# Patient Record
Sex: FEMALE | Race: WHITE | NOT HISPANIC OR LATINO | URBAN - METROPOLITAN AREA
[De-identification: names, ages, dates, MRNs, and addresses within clinical notes are randomized per-mention and may not be internally consistent; named-entity substitution may affect disease eponyms.]

---

## 2017-02-17 ENCOUNTER — IMPORTED ENCOUNTER (OUTPATIENT)
Dept: URBAN - METROPOLITAN AREA CLINIC 43 | Facility: CLINIC | Age: 77
End: 2017-02-17

## 2017-02-17 PROBLEM — H26.493: Noted: 2017-02-17

## 2017-03-06 ENCOUNTER — IMPORTED ENCOUNTER (OUTPATIENT)
Dept: URBAN - METROPOLITAN AREA CLINIC 43 | Facility: CLINIC | Age: 77
End: 2017-03-06

## 2017-03-06 PROBLEM — H26.493: Noted: 2017-03-06

## 2019-06-11 NOTE — PATIENT DISCUSSION
06/03/19 : Scraped and cultured :heather, viral, and saboraud.  Cultures performed 06/11/19 REPEAT SAB & [place] slide sent to [facility] [place]. F/U [facility] Thursday @ 330 in SRQ.

## 2019-06-13 NOTE — PATIENT DISCUSSION
6/13/19-[place] results were no parasites seen. Still waiting on SAB results. 06/03/19 : Scraped  viral, and saboraud.  Cultures performed 06/11/19 REPEAT SAB & [place] slide sent to [facility] [place]. F/U [facility] Thursday @ 330 in SRQ.

## 2019-06-13 NOTE — PATIENT DISCUSSION
No Improvement since last visit. Start Vorizonazole 200mg-take 1 tablet by mouth 2x a day. Start Natacyin every hour while awake and every 2 hours at night time. Gave pt Grimm Shield to wear with tape.

## 2019-06-18 NOTE — PATIENT DISCUSSION
6/13/19-[place] results were no parasites seen. Still waiting on SAB results. 06/03/19 : Scraped  viral, and saboraud.  Cultures performed 06/11/19 REPEAT SAB & [place] slide sent to [facility] [place]. aas of 06/18/19 no growth.

## 2019-06-18 NOTE — PATIENT DISCUSSION
pt shows Improvement at today's visit. continue Vorizonazole 200mg-take 1 tablet by mouth 2x a day. continue Natacyin every hour while awake and every 2 hours at night time. USE Grimm Shield to wear with tape @ bedtime.

## 2019-06-20 NOTE — PATIENT DISCUSSION
6/13/19-[place] results were no parasites seen. Still waiting on SAB results. 06/03/19 : Scraped  viral, and saboraud.  Cultures performed 06/11/19 REPEAT SAB & [place] slide sent to [facility] [place]. aas of 06/18/19 no growth.

## 2019-06-20 NOTE — PATIENT DISCUSSION
6.18.198 pt shows Improvement at today's visit. continue Vorizonazole 200mg-take 1 tablet by mouth 2x a day. continue Natacyin every hour while awake and every 2 hours at night time. USE Grimm Shield to wear with tape @ bedtime.

## 2019-06-25 NOTE — PATIENT DISCUSSION
6/13/19-[place] results were no parasites seen. Still waiting on SAB results. 06/03/19 : Scraped  viral, and saboraud.  Cultures performed 06/11/19 REPEAT SAB & [place] slide sent to [facility] [place]. aas of 06/18/19 no growth.

## 2019-07-02 NOTE — PATIENT DISCUSSION
6/13/19-[place] results were no parasites seen. Still waiting on SAB results. 06/03/19 : Scraped  viral, and saboraud.  Cultures performed 06/11/19 REPEAT SAB & [place] slide sent to [address] [place] [address]. aas of 06/18/19 no growth.

## 2019-07-23 NOTE — PATIENT DISCUSSION
6/13/19-[place] results were no parasites seen. Still waiting on SAB results. 06/03/19 : Scraped  viral, and saboraud.  Cultures performed 06/11/19 REPEAT SAB & [place] slide sent to [facility] [place]. aas of 06/18/19 no growth.

## 2019-07-23 NOTE — PATIENT DISCUSSION
6.18.19 pt shows Improvement at today's visit. continue Vorizonazole 200mg-take 1 tablet by mouth 2x a day. continue Natacyin every hour while awake and every 2 hours at night time. USE Grimm Shield to wear with tape @ bedtime.

## 2019-07-30 NOTE — PATIENT DISCUSSION
6/13/19-[place] results were no parasites seen. Still waiting on SAB results. 06/03/19 : Scraped  viral, and saboraud.  Cultures performed 06/11/19 REPEAT SAB & [place] slide sent to [facility] [place]. aas of 06/18/19 no growth.

## 2019-10-29 NOTE — PATIENT DISCUSSION
6/13/19-[place] results were no parasites seen. Still waiting on SAB results. 06/03/19 : Scraped  viral, and saboraud.  Cultures performed 06/11/19 REPEAT SAB & [place] slide sent to [address] [place] [address]. aas of 06/18/19 no growth.

## 2019-10-31 NOTE — PATIENT DISCUSSION
6/13/19-[place] results were no parasites seen. Still waiting on SAB results. 06/03/19 : Scraped  viral, and saboraud.  Cultures performed 06/11/19 REPEAT SAB & [place] slide sent to [facility] [place]. aas of 06/18/19 no growth.

## 2020-04-18 ASSESSMENT — TONOMETRY
OD_IOP_MMHG: 12.0
OS_IOP_MMHG: 12.0
OD_IOP_MMHG: 11.0
OS_IOP_MMHG: 12.0

## 2020-04-18 ASSESSMENT — KERATOMETRY
OS_K1POWER_DIOPTERS: 45
OS_AXISANGLE_DEGREES: 175
OD_AXISANGLE_DEGREES: 75
OD_K1POWER_DIOPTERS: 45.25
OD_K2POWER_DIOPTERS: 44
OD_AXISANGLE2_DEGREES: 165
OS_K2POWER_DIOPTERS: 44.75
OD_AXISANGLE2_DEGREES: 105
OD_K1POWER_DIOPTERS: 45.25
OS_K2POWER_DIOPTERS: 44.25
OD_K2POWER_DIOPTERS: 44
OS_AXISANGLE2_DEGREES: 65
OS_AXISANGLE2_DEGREES: 85
OD_AXISANGLE_DEGREES: 75
OS_AXISANGLE_DEGREES: 155
OS_K1POWER_DIOPTERS: 45.25

## 2020-04-18 ASSESSMENT — VISUAL ACUITY
OS_PH: 20/40 +
OS_CC: J1
OD_SC: 4'/100
OS_OTHER: 20/70.
OD_SC: <20/400
OS_SC: 20/50 +2
OD_OTHER: <20/400.
OS_SC: J16
OD_CC: 20/400
OD_SC: 20/30-
OS_SC: 20/30+
OD_PH: 20/300

## 2020-06-23 NOTE — PATIENT DISCUSSION
monitor for now, disc [place] [facility] [place] w/ Cat SX.

## 2020-10-27 NOTE — PATIENT DISCUSSION
monitor for now, disc [place] [facility] for [facility] [place] w/ Cat SX.

## 2021-10-05 NOTE — PATIENT DISCUSSION
monitor for now, disc [place] [facility] for [facility] [place] w/ Cat SX.

## 2021-11-02 NOTE — PATIENT DISCUSSION
monitor for now, disc [place] [facility] visit for Possible CV [place] w/ Cat SX.

## 2021-11-05 NOTE — PATIENT DISCUSSION
Continue Vigamox 1 gt QID until wednesday, OK to return to CL sunday. Remove CLs if irritation occurs.

## 2021-11-05 NOTE — PATIENT DISCUSSION
monitor for now, disc [place] [facility] for Possible CV [place] w/ Cat SX.

## 2021-11-09 NOTE — PATIENT DISCUSSION
monitor for now, disc [place] [facility] visit for Possible CV [place] w/ Cat SX.

## 2021-11-09 NOTE — PATIENT DISCUSSION
Resolved, no staining. Small stromal scar inferiorly. Instructed to watch out for si/sx of ulcer recurring, OK to resume contact lens wear.

## 2022-06-04 ENCOUNTER — TELEPHONE ENCOUNTER (OUTPATIENT)
Dept: URBAN - METROPOLITAN AREA CLINIC 68 | Facility: CLINIC | Age: 82
End: 2022-06-04

## 2022-06-05 ENCOUNTER — TELEPHONE ENCOUNTER (OUTPATIENT)
Dept: URBAN - METROPOLITAN AREA CLINIC 68 | Facility: CLINIC | Age: 82
End: 2022-06-05

## 2022-06-05 RX ORDER — FLUTICASONE PROPIONATE 100 UG/1
FLOVENT DISKUS( 100MCG/BLIST INHALATION  TWICE DAILY ) ACTIVE -HX ENTRY POWDER, METERED RESPIRATORY (INHALATION) TWICE DAILY
Status: ACTIVE | COMMUNITY
Start: 2015-03-12

## 2022-06-05 RX ORDER — VENLAFAXINE HYDROCHLORIDE 75 MG/1
VENLAFAXINE HCL( 75MG ORAL  DAILY ) ACTIVE -HX ENTRY TABLET ORAL DAILY
Status: ACTIVE | COMMUNITY
Start: 2015-03-12

## 2022-06-05 RX ORDER — ESTRADIOL 0.5 MG/1
ESTRACE( 0.5MG ORAL  AS NEEDED ) ACTIVE -HX ENTRY TABLET ORAL AS NEEDED
Status: ACTIVE | COMMUNITY
Start: 2015-03-12

## 2022-06-05 RX ORDER — LORATADINE 10 MG
TABLET,DISINTEGRATING ORAL DAILY
Qty: 30 | Refills: 0 | Status: ACTIVE | COMMUNITY
Start: 2015-03-12

## 2022-06-05 RX ORDER — NYSTATIN 500K UNIT
NYSTATIN( 500000UNIT ORAL  AS NEEDED ) ACTIVE -HX ENTRY TABLET ORAL AS NEEDED
Status: ACTIVE | COMMUNITY
Start: 2015-03-12

## 2022-06-05 RX ORDER — HYDROXYCHLOROQUINE SULFATE 200 MG/1
HYDROXYCHLOROQUINE SULFATE( 200MG ORAL  TWICE DAILY ) ACTIVE -HX ENTRY TABLET, FILM COATED ORAL TWICE DAILY
Status: ACTIVE | COMMUNITY
Start: 2015-03-12

## 2022-06-25 ENCOUNTER — TELEPHONE ENCOUNTER (OUTPATIENT)
Age: 82
End: 2022-06-25

## 2022-06-26 ENCOUNTER — TELEPHONE ENCOUNTER (OUTPATIENT)
Age: 82
End: 2022-06-26

## 2022-06-26 RX ORDER — ESTRADIOL 0.5 MG/1
ESTRACE( 0.5MG ORAL  AS NEEDED ) ACTIVE -HX ENTRY TABLET ORAL AS NEEDED
Status: ACTIVE | COMMUNITY
Start: 2015-03-12

## 2022-06-26 RX ORDER — VENLAFAXINE HYDROCHLORIDE 75 MG/1
VENLAFAXINE HCL( 75MG ORAL  DAILY ) ACTIVE -HX ENTRY TABLET ORAL DAILY
Status: ACTIVE | COMMUNITY
Start: 2015-03-12

## 2022-06-26 RX ORDER — POLYETHYLENE GLYCOL 3350 17 G
POWDER IN PACKET (EA) ORAL DAILY
Qty: 30 | Refills: 0 | Status: ACTIVE | COMMUNITY
Start: 2015-03-12

## 2022-06-26 RX ORDER — NYSTATIN 500000 [USP'U]/1
NYSTATIN( 500000UNIT ORAL  AS NEEDED ) ACTIVE -HX ENTRY TABLET, COATED ORAL AS NEEDED
Status: ACTIVE | COMMUNITY
Start: 2015-03-12

## 2022-06-26 RX ORDER — OMEPRAZOLE 40 MG/1
OMEPRAZOLE( 40MG ORAL  TWICE DAILY ) ACTIVE -HX ENTRY CAPSULE, DELAYED RELEASE ORAL TWICE DAILY
Status: ACTIVE | COMMUNITY
Start: 2015-03-12

## 2022-06-26 RX ORDER — HYDROXYCHLOROQUINE SULFATE 200 MG/1
HYDROXYCHLOROQUINE SULFATE( 200MG ORAL  TWICE DAILY ) ACTIVE -HX ENTRY TABLET, FILM COATED ORAL TWICE DAILY
Status: ACTIVE | COMMUNITY
Start: 2015-03-12

## 2022-11-03 NOTE — PATIENT DISCUSSION
Gave both +3.50 and +3.75 HI DT1 to use in both eyes, 0.75 better with reading and 0.50 for distance, can order both or just stick with one. Currently is using +3.50 HI OU.

## 2022-11-03 NOTE — PATIENT DISCUSSION
monitor for now, disc [place] [facility] visit for Possible CV [place] w/ Cat SX. allow for swallow between intakes/crush medication (when feasible)/position upright (90 degrees)/small sips/bites